# Patient Record
(demographics unavailable — no encounter records)

---

## 2024-12-17 NOTE — PHYSICAL EXAM
[Chaperone Present] : A chaperone was present in the examining room during all aspects of the physical examination [06065] : A chaperone was present during the pelvic exam. [FreeTextEntry2] : Wendi [Appropriately responsive] : appropriately responsive [Alert] : alert [No Acute Distress] : no acute distress [No Lymphadenopathy] : no lymphadenopathy [Regular Rate Rhythm] : regular rate rhythm [No Murmurs] : no murmurs [Clear to Auscultation B/L] : clear to auscultation bilaterally [Soft] : soft [Non-tender] : non-tender [Non-distended] : non-distended [No HSM] : No HSM [No Lesions] : no lesions [No Mass] : no mass [Oriented x3] : oriented x3 [Examination Of The Breasts] : a normal appearance [No Masses] : no breast masses were palpable [Labia Majora] : normal [Labia Minora] : normal [No Bleeding] : There was no active vaginal bleeding [Normal] : normal [Tenderness] : tender [Enlarged ___ wks] : enlarged [unfilled] ~Uweeks [Uterine Adnexae] : normal [Declined] : Patient declined rectal exam

## 2024-12-17 NOTE — HISTORY OF PRESENT ILLNESS
[FreeTextEntry1] : 42 yo   in for annual exam Patient reports menses are prolonged 8-10 days and very heavy Reports this has been going on for several years.  BTL in   Denies breast pain, breast mass, nipple dc, or skin dimpling Denies severe abdominal pain or frequent constipation   Denies blood in stools  Denies vaginal discharge

## 2024-12-17 NOTE — PLAN
[FreeTextEntry1] : thin prep with hr hpv  Pelvic US ordered  Mammogram ordered  follow up Gyn 3 weeks to discuss US findings and heavy menses

## 2025-03-03 NOTE — DISCUSSION/SUMMARY
[FreeTextEntry1] : I reviewed findings of today's TVUS with patient I would like to repeat to re-evaluate cyst in 2 months We discussed option of hormonal contraception to manage menorrhagia and she is agreeable to try OCPs although she does not need contraceptive benefit.  She has no absolute contraindication to same Patient to contact provider with any of the following warning signs of hormonal contraceptives: Abdominal pain CHest pain Headaches Visual Changes Shortness of breath Pain or swelling in limbs We also discussed endometrial ablation and I have given her literature on this in [place] to read over.  To schedule consult with Dr. Headley if this is a managment option she would like to consider.  RTO x 2 months for repeat US and pill check

## 2025-03-03 NOTE — HISTORY OF PRESENT ILLNESS
[FreeTextEntry1] : 42 yo    here for follow up of TVUS results ordered for evaluation of menorrhagia.  States she was told in the past in [place] that she would need a hysterectomy due to multiple fibroids and bleeding.  s/p BTL.  Has used OCPs in past without adverse effects.  TVUS today: EML=8mm [id number] uterus right ovary WNL left ovary with simple cyst 3.9x2.7x3.7 No myomas were visualized

## 2025-05-05 NOTE — HISTORY OF PRESENT ILLNESS
[FreeTextEntry1] : 40 yo here for follow up of TVUS results ordered for evaluation of menorrhagia.  States she was told in the past in [place] that she would need a hysterectomy due to multiple fibroids and bleeding. s/p BTL. Has used OCPs in past without adverse effects.  TVUS results today

## 2025-05-05 NOTE — DISCUSSION/SUMMARY
[FreeTextEntry1] : We previously discussed option of hormonal contraception to manage menorrhagia and she is agreeable to try OCPs although she does not need contraceptive benefit. She has no absolute contraindication to same. We also discussed endometrial ablation and I provided her with literature on this in [place] to read over. To schedule consult with Dr. Headley if this is a managment option she would like to consider.